# Patient Record
Sex: FEMALE | Race: WHITE | Employment: FULL TIME | URBAN - METROPOLITAN AREA
[De-identification: names, ages, dates, MRNs, and addresses within clinical notes are randomized per-mention and may not be internally consistent; named-entity substitution may affect disease eponyms.]

---

## 2023-09-20 ENCOUNTER — OFFICE VISIT (OUTPATIENT)
Dept: ENT CLINIC | Age: 58
End: 2023-09-20
Payer: COMMERCIAL

## 2023-09-20 VITALS
HEART RATE: 63 BPM | SYSTOLIC BLOOD PRESSURE: 127 MMHG | HEIGHT: 65 IN | OXYGEN SATURATION: 98 % | WEIGHT: 191 LBS | BODY MASS INDEX: 31.82 KG/M2 | RESPIRATION RATE: 16 BRPM | DIASTOLIC BLOOD PRESSURE: 81 MMHG

## 2023-09-20 DIAGNOSIS — J34.89 NASAL OBSTRUCTION: ICD-10-CM

## 2023-09-20 DIAGNOSIS — J32.9 CHRONIC SINUSITIS, UNSPECIFIED LOCATION: ICD-10-CM

## 2023-09-20 DIAGNOSIS — R09.81 NASAL CONGESTION: ICD-10-CM

## 2023-09-20 DIAGNOSIS — J34.2 DEVIATED NASAL SEPTUM: ICD-10-CM

## 2023-09-20 DIAGNOSIS — J34.3 HYPERTROPHY OF BOTH INFERIOR NASAL TURBINATES: ICD-10-CM

## 2023-09-20 DIAGNOSIS — H04.202 LEFT EPIPHORA: Primary | ICD-10-CM

## 2023-09-20 PROCEDURE — 31231 NASAL ENDOSCOPY DX: CPT | Performed by: STUDENT IN AN ORGANIZED HEALTH CARE EDUCATION/TRAINING PROGRAM

## 2023-09-20 PROCEDURE — 99204 OFFICE O/P NEW MOD 45 MIN: CPT | Performed by: STUDENT IN AN ORGANIZED HEALTH CARE EDUCATION/TRAINING PROGRAM

## 2023-09-20 RX ORDER — CELECOXIB 200 MG/1
200 CAPSULE ORAL 2 TIMES DAILY
COMMUNITY
Start: 2022-12-12

## 2023-09-20 RX ORDER — DULOXETIN HYDROCHLORIDE 60 MG/1
CAPSULE, DELAYED RELEASE ORAL
COMMUNITY
Start: 2023-08-31

## 2023-09-20 RX ORDER — CETIRIZINE HYDROCHLORIDE 10 MG/1
10 TABLET ORAL EVERY MORNING
COMMUNITY

## 2023-09-20 RX ORDER — FLUTICASONE PROPIONATE 50 MCG
SPRAY, SUSPENSION (ML) NASAL
COMMUNITY
Start: 2023-04-11

## 2023-09-20 RX ORDER — OMEPRAZOLE 20 MG/1
CAPSULE, DELAYED RELEASE ORAL
COMMUNITY
Start: 2023-07-13

## 2023-09-20 RX ORDER — BUSPIRONE HYDROCHLORIDE 15 MG/1
7.5-15 TABLET ORAL 2 TIMES DAILY
COMMUNITY
Start: 2022-12-12

## 2023-09-20 NOTE — PROGRESS NOTES
Chriss Estrada (:  1965) is a 61 y.o. female, here for evaluation of the following chief complaint(s):  New Patient (Blocked tear duct left )      ASSESSMENT/PLAN:  1. Left epiphora  2. Deviated nasal septum  3. Chronic sinusitis, unspecified location  -     CT SINUS FOR IMAGE GUIDANCE; Future  4. Nasal obstruction  5. Nasal congestion  6. Hypertrophy of both inferior nasal turbinates      This is a very pleasant 61 y.o. female here today for evaluation of the the above-noted complaints. The patient has physical exam findings and history consistent with nasal obstruction in the setting of septal deviation and turbinate hypertrophy as well as left-sided epiphora. We discussed that she has tried appropriate medical therapy including nasal saline, fluticasone, oral antihistamines, antibiotics and antibiotic eyedrops and is still symptomatic and would be a candidate for revision septoplasty and inferior turbinate reduction. We will also plan to perform an endoscopic dacryocystorhinostomy and left-sided anterior ethmoidectomy.     We will obtain a CT scan of her sinuses to evaluate for chronic sinusitis and other anatomical abnormalities and to allow for preoperative planning.      -Risks of sinus surgery include anosmia/hyposmia, dysgeusia, hemorrhage, pain, infection/inflammation, numbness to the maxillary tissues or dentition, CSF leak (with risk of meningitis or intracranial infection), orbital complications (diplopia, blurry vision, blindness), need for further procedures  -Anesthesia carries its own complications which will need to be discussed with the Anesthesiology team on the day of surgery  -Patient will need to obtain PCP clearance prior to surgery, will need to be off anticoagulants prior to surgery   -Risks and benefits of septoplasty including pain, inflammation, infection, hemorrhage, cosmesis (including nasal valve

## 2023-09-21 DIAGNOSIS — J34.2 DEVIATED NASAL SEPTUM: Primary | ICD-10-CM

## 2023-09-21 DIAGNOSIS — J32.9 CHRONIC SINUSITIS, UNSPECIFIED LOCATION: ICD-10-CM

## 2023-09-21 DIAGNOSIS — H04.202 LEFT EPIPHORA: ICD-10-CM

## 2023-09-26 ENCOUNTER — TELEPHONE (OUTPATIENT)
Dept: ENT CLINIC | Age: 58
End: 2023-09-26

## 2023-09-26 NOTE — TELEPHONE ENCOUNTER
Coretta Robertson from [address] prior [employer] to make sure we are doing PA for pt's CT that is scheduled at [facility] on 10/3/23; please fax [facility] when received to [facility] at [phone]. Call if questions.

## 2023-09-28 NOTE — TELEPHONE ENCOUNTER
CT Scan approved [facility] [place]

## 2023-10-03 ENCOUNTER — PATIENT MESSAGE (OUTPATIENT)
Dept: ENT CLINIC | Age: 58
End: 2023-10-03

## 2023-10-27 NOTE — PROGRESS NOTES
[address]  time__0600______        Surgery time_____0730_______    Take the following medications with a sip of water: Follow your MD/Surgeons pre-procedure instructions regarding your medications     Do not eat or drink anything after 12:00 midnight prior to your surgery. This includes water chewing gum, mints and ice chips. You may brush your teeth and gargle the morning of your surgery, but do not swallow the water     Please see your family doctor/pediatrician for a history and physical and/or concerning medications. Bring any test results/reports from your physicians office. If you are under the care of a heart doctor or specialist doctor, please be aware that you may be asked to them for clearance    You may be asked to stop blood thinners such as Coumadin, Plavix, Fragmin, Lovenox, etc., or any anti-inflammatories such as:  Aspirin, Ibuprofen, Advil, Naproxen prior to your surgery. We also ask that you stop any OTC medications such as fish oil, vitamin E, glucosamine, garlic, Multivitamins, COQ 10, etc.    We ask that you do not smoke 24 hours prior to surgery  We ask that you do not  drink any alcoholic beverages 24 hours prior to surgery     You must make arrangements for a responsible adult to take you home after your surgery. For your safety you will not be allowed to leave alone or drive yourself home. Your surgery will be cancelled if you do not have a ride home. Also for your safety, it is strongly suggested that someone stay with you the first 24 hours after your surgery. A parent or legal guardian must accompany a child scheduled for surgery and plan to stay at the hospital until the child is discharged. Please do not bring other children with you. For your comfort, please wear simple loose fitting clothing to the hospital.  Please do not bring valuables.     Do not wear any make-up or nail polish on your fingers or

## 2023-10-27 NOTE — PROGRESS NOTES
[facility] Pre-Admission Testing Electronic Communication Worksheet for OR/ENDO Procedures        Patient: Pascual Franks    DOS: 11/9    Arrival Time: 0600    Surgery Time:0730    Meds to Bed:  [x] YES    []  NO    Transportation Confirmed: [x] YES    []  NO    History and Physical:  [x] YES    []  NO  [] N/A  If yes, please list doctor or Urgent Care and date of H&P:   see Joann Worthington in encounters or media for 10/18 H&P with EKG  Sandy Solorzano in encounters al Clearance(Cardiac, Pulmonary, etc):  [] YES    [x]  NO    Pre-Admission Testing Visit:  [] YES    [x]  NO If no, do labs/testing need to be done DOS?   [] YES    []  NO    Medication Reconciliation Complete:  [x] YES    []  NO        Additional Notes:                Interview Complete: [x] YES    []  Nikki Navarro RN  12:24 PM

## 2023-11-08 ENCOUNTER — ANESTHESIA EVENT (OUTPATIENT)
Dept: OPERATING ROOM | Age: 58
End: 2023-11-08
Payer: COMMERCIAL

## 2023-11-09 ENCOUNTER — ANESTHESIA (OUTPATIENT)
Dept: OPERATING ROOM | Age: 58
End: 2023-11-09
Payer: COMMERCIAL

## 2023-11-09 ENCOUNTER — HOSPITAL ENCOUNTER (OUTPATIENT)
Age: 58
Setting detail: OUTPATIENT SURGERY
Discharge: HOME | End: 2023-11-09
Attending: STUDENT IN AN ORGANIZED HEALTH CARE EDUCATION/TRAINING PROGRAM | Admitting: STUDENT IN AN ORGANIZED HEALTH CARE EDUCATION/TRAINING PROGRAM
Payer: COMMERCIAL

## 2023-11-09 VITALS
DIASTOLIC BLOOD PRESSURE: 79 MMHG | BODY MASS INDEX: 31.63 KG/M2 | WEIGHT: 189.82 LBS | TEMPERATURE: 98.4 F | HEART RATE: 92 BPM | OXYGEN SATURATION: 96 % | SYSTOLIC BLOOD PRESSURE: 134 MMHG | HEIGHT: 65 IN | RESPIRATION RATE: 18 BRPM

## 2023-11-09 DIAGNOSIS — G89.18 POST-OP PAIN: Primary | ICD-10-CM

## 2023-11-09 PROBLEM — J34.89 REFRACTORY OBSTRUCTION OF NASAL AIRWAY: Status: ACTIVE | Noted: 2023-11-09

## 2023-11-09 PROCEDURE — 2500000003 HC RX 250 WO HCPCS

## 2023-11-09 PROCEDURE — 2580000003 HC RX 258

## 2023-11-09 PROCEDURE — 7100000011 HC PHASE II RECOVERY - ADDTL 15 MIN: Performed by: STUDENT IN AN ORGANIZED HEALTH CARE EDUCATION/TRAINING PROGRAM

## 2023-11-09 PROCEDURE — 6360000002 HC RX W HCPCS

## 2023-11-09 PROCEDURE — 7100000010 HC PHASE II RECOVERY - FIRST 15 MIN: Performed by: STUDENT IN AN ORGANIZED HEALTH CARE EDUCATION/TRAINING PROGRAM

## 2023-11-09 PROCEDURE — 2709999900 HC NON-CHARGEABLE SUPPLY: Performed by: STUDENT IN AN ORGANIZED HEALTH CARE EDUCATION/TRAINING PROGRAM

## 2023-11-09 PROCEDURE — 7100000000 HC PACU RECOVERY - FIRST 15 MIN: Performed by: STUDENT IN AN ORGANIZED HEALTH CARE EDUCATION/TRAINING PROGRAM

## 2023-11-09 PROCEDURE — 2500000003 HC RX 250 WO HCPCS: Performed by: OPHTHALMOLOGY

## 2023-11-09 PROCEDURE — 31240 NSL/SNS NDSC CNCH BULL RESCJ: CPT | Performed by: STUDENT IN AN ORGANIZED HEALTH CARE EDUCATION/TRAINING PROGRAM

## 2023-11-09 PROCEDURE — 6360000002 HC RX W HCPCS: Performed by: ANESTHESIOLOGY

## 2023-11-09 PROCEDURE — 6360000002 HC RX W HCPCS: Performed by: STUDENT IN AN ORGANIZED HEALTH CARE EDUCATION/TRAINING PROGRAM

## 2023-11-09 PROCEDURE — 3700000001 HC ADD 15 MINUTES (ANESTHESIA): Performed by: STUDENT IN AN ORGANIZED HEALTH CARE EDUCATION/TRAINING PROGRAM

## 2023-11-09 PROCEDURE — 7100000001 HC PACU RECOVERY - ADDTL 15 MIN: Performed by: STUDENT IN AN ORGANIZED HEALTH CARE EDUCATION/TRAINING PROGRAM

## 2023-11-09 PROCEDURE — 61782 SCAN PROC CRANIAL EXTRA: CPT | Performed by: STUDENT IN AN ORGANIZED HEALTH CARE EDUCATION/TRAINING PROGRAM

## 2023-11-09 PROCEDURE — 2720000010 HC SURG SUPPLY STERILE: Performed by: STUDENT IN AN ORGANIZED HEALTH CARE EDUCATION/TRAINING PROGRAM

## 2023-11-09 PROCEDURE — C1783 OCULAR IMP, AQUEOUS DRAIN DE: HCPCS | Performed by: STUDENT IN AN ORGANIZED HEALTH CARE EDUCATION/TRAINING PROGRAM

## 2023-11-09 PROCEDURE — 2580000003 HC RX 258: Performed by: ANESTHESIOLOGY

## 2023-11-09 PROCEDURE — A4217 STERILE WATER/SALINE, 500 ML: HCPCS | Performed by: STUDENT IN AN ORGANIZED HEALTH CARE EDUCATION/TRAINING PROGRAM

## 2023-11-09 PROCEDURE — 3700000000 HC ANESTHESIA ATTENDED CARE: Performed by: STUDENT IN AN ORGANIZED HEALTH CARE EDUCATION/TRAINING PROGRAM

## 2023-11-09 PROCEDURE — 2580000003 HC RX 258: Performed by: STUDENT IN AN ORGANIZED HEALTH CARE EDUCATION/TRAINING PROGRAM

## 2023-11-09 PROCEDURE — 2500000003 HC RX 250 WO HCPCS: Performed by: STUDENT IN AN ORGANIZED HEALTH CARE EDUCATION/TRAINING PROGRAM

## 2023-11-09 PROCEDURE — 6370000000 HC RX 637 (ALT 250 FOR IP): Performed by: ANESTHESIOLOGY

## 2023-11-09 PROCEDURE — 30520 REPAIR OF NASAL SEPTUM: CPT | Performed by: STUDENT IN AN ORGANIZED HEALTH CARE EDUCATION/TRAINING PROGRAM

## 2023-11-09 PROCEDURE — 3600000005 HC SURGERY LEVEL 5 BASE: Performed by: STUDENT IN AN ORGANIZED HEALTH CARE EDUCATION/TRAINING PROGRAM

## 2023-11-09 PROCEDURE — 3600000015 HC SURGERY LEVEL 5 ADDTL 15MIN: Performed by: STUDENT IN AN ORGANIZED HEALTH CARE EDUCATION/TRAINING PROGRAM

## 2023-11-09 PROCEDURE — 30140 RESECT INFERIOR TURBINATE: CPT | Performed by: STUDENT IN AN ORGANIZED HEALTH CARE EDUCATION/TRAINING PROGRAM

## 2023-11-09 DEVICE — LACRIMAL INTUBATION SET CRAWFORD
Type: IMPLANTABLE DEVICE | Site: LACRIMAL DUCT | Status: FUNCTIONAL
Brand: CRAWFORD

## 2023-11-09 RX ORDER — GLYCOPYRROLATE 0.2 MG/ML
INJECTION INTRAMUSCULAR; INTRAVENOUS PRN
Status: DISCONTINUED | OUTPATIENT
Start: 2023-11-09 | End: 2023-11-09 | Stop reason: SDUPTHER

## 2023-11-09 RX ORDER — FAMOTIDINE 10 MG/ML
INJECTION, SOLUTION INTRAVENOUS PRN
Status: DISCONTINUED | OUTPATIENT
Start: 2023-11-09 | End: 2023-11-09 | Stop reason: SDUPTHER

## 2023-11-09 RX ORDER — PROPOFOL 10 MG/ML
INJECTION, EMULSION INTRAVENOUS PRN
Status: DISCONTINUED | OUTPATIENT
Start: 2023-11-09 | End: 2023-11-09 | Stop reason: SDUPTHER

## 2023-11-09 RX ORDER — LIDOCAINE HYDROCHLORIDE AND EPINEPHRINE 10; 10 MG/ML; UG/ML
INJECTION, SOLUTION INFILTRATION; PERINEURAL
Status: COMPLETED | OUTPATIENT
Start: 2023-11-09 | End: 2023-11-09

## 2023-11-09 RX ORDER — SODIUM CHLORIDE, SODIUM LACTATE, POTASSIUM CHLORIDE, CALCIUM CHLORIDE 600; 310; 30; 20 MG/100ML; MG/100ML; MG/100ML; MG/100ML
INJECTION, SOLUTION INTRAVENOUS CONTINUOUS PRN
Status: DISCONTINUED | OUTPATIENT
Start: 2023-11-09 | End: 2023-11-09 | Stop reason: SDUPTHER

## 2023-11-09 RX ORDER — PHENYLEPHRINE HCL IN 0.9% NACL 1 MG/10 ML
SYRINGE (ML) INTRAVENOUS PRN
Status: DISCONTINUED | OUTPATIENT
Start: 2023-11-09 | End: 2023-11-09

## 2023-11-09 RX ORDER — ONDANSETRON 2 MG/ML
4 INJECTION INTRAMUSCULAR; INTRAVENOUS
Status: DISCONTINUED | OUTPATIENT
Start: 2023-11-09 | End: 2023-11-09 | Stop reason: HOSPADM

## 2023-11-09 RX ORDER — SODIUM CHLORIDE 0.9 % (FLUSH) 0.9 %
5-40 SYRINGE (ML) INJECTION PRN
Status: DISCONTINUED | OUTPATIENT
Start: 2023-11-09 | End: 2023-11-09 | Stop reason: HOSPADM

## 2023-11-09 RX ORDER — DEXAMETHASONE SODIUM PHOSPHATE 4 MG/ML
INJECTION, SOLUTION INTRA-ARTICULAR; INTRALESIONAL; INTRAMUSCULAR; INTRAVENOUS; SOFT TISSUE PRN
Status: DISCONTINUED | OUTPATIENT
Start: 2023-11-09 | End: 2023-11-09 | Stop reason: SDUPTHER

## 2023-11-09 RX ORDER — SODIUM CHLORIDE 0.9 % (FLUSH) 0.9 %
5-40 SYRINGE (ML) INJECTION EVERY 12 HOURS SCHEDULED
Status: DISCONTINUED | OUTPATIENT
Start: 2023-11-09 | End: 2023-11-09 | Stop reason: HOSPADM

## 2023-11-09 RX ORDER — SODIUM CHLORIDE 9 MG/ML
INJECTION, SOLUTION INTRAVENOUS PRN
Status: DISCONTINUED | OUTPATIENT
Start: 2023-11-09 | End: 2023-11-09 | Stop reason: HOSPADM

## 2023-11-09 RX ORDER — BALANCED SALT SOLUTION 6.4; .75; .48; .3; 3.9; 1.7 MG/ML; MG/ML; MG/ML; MG/ML; MG/ML; MG/ML
SOLUTION OPHTHALMIC
Status: COMPLETED | OUTPATIENT
Start: 2023-11-09 | End: 2023-11-09

## 2023-11-09 RX ORDER — EPINEPHRINE 1 MG/ML
INJECTION, SOLUTION, CONCENTRATE INTRAVENOUS
Status: COMPLETED | OUTPATIENT
Start: 2023-11-09 | End: 2023-11-09

## 2023-11-09 RX ORDER — ECHINACEA PURPUREA EXTRACT 125 MG
TABLET ORAL
Qty: 30 ML | Refills: 1 | Status: SHIPPED | OUTPATIENT
Start: 2023-11-09

## 2023-11-09 RX ORDER — FENTANYL CITRATE 50 UG/ML
INJECTION, SOLUTION INTRAMUSCULAR; INTRAVENOUS PRN
Status: DISCONTINUED | OUTPATIENT
Start: 2023-11-09 | End: 2023-11-09 | Stop reason: SDUPTHER

## 2023-11-09 RX ORDER — FENTANYL CITRATE 0.05 MG/ML
50 INJECTION, SOLUTION INTRAMUSCULAR; INTRAVENOUS EVERY 5 MIN PRN
Status: DISCONTINUED | OUTPATIENT
Start: 2023-11-09 | End: 2023-11-09 | Stop reason: HOSPADM

## 2023-11-09 RX ORDER — SCOLOPAMINE TRANSDERMAL SYSTEM 1 MG/1
1 PATCH, EXTENDED RELEASE TRANSDERMAL ONCE
Status: DISCONTINUED | OUTPATIENT
Start: 2023-11-09 | End: 2023-11-09 | Stop reason: HOSPADM

## 2023-11-09 RX ORDER — OXYCODONE HYDROCHLORIDE 10 MG/1
10 TABLET ORAL PRN
Status: COMPLETED | OUTPATIENT
Start: 2023-11-09 | End: 2023-11-09

## 2023-11-09 RX ORDER — LIDOCAINE HYDROCHLORIDE 20 MG/ML
INJECTION, SOLUTION EPIDURAL; INFILTRATION; INTRACAUDAL; PERINEURAL PRN
Status: DISCONTINUED | OUTPATIENT
Start: 2023-11-09 | End: 2023-11-09 | Stop reason: SDUPTHER

## 2023-11-09 RX ORDER — DOXYCYCLINE HYCLATE 100 MG
100 TABLET ORAL 2 TIMES DAILY
Qty: 14 TABLET | Refills: 0 | Status: SHIPPED | OUTPATIENT
Start: 2023-11-09 | End: 2023-11-16

## 2023-11-09 RX ORDER — ACETAMINOPHEN 325 MG/1
650 TABLET ORAL
Status: DISCONTINUED | OUTPATIENT
Start: 2023-11-09 | End: 2023-11-09 | Stop reason: HOSPADM

## 2023-11-09 RX ORDER — OXYCODONE HYDROCHLORIDE 5 MG/1
5 TABLET ORAL PRN
Status: COMPLETED | OUTPATIENT
Start: 2023-11-09 | End: 2023-11-09

## 2023-11-09 RX ORDER — MAGNESIUM HYDROXIDE 1200 MG/15ML
LIQUID ORAL CONTINUOUS PRN
Status: DISCONTINUED | OUTPATIENT
Start: 2023-11-09 | End: 2023-11-09 | Stop reason: HOSPADM

## 2023-11-09 RX ORDER — PROCHLORPERAZINE EDISYLATE 5 MG/ML
5 INJECTION INTRAMUSCULAR; INTRAVENOUS
Status: DISCONTINUED | OUTPATIENT
Start: 2023-11-09 | End: 2023-11-09 | Stop reason: HOSPADM

## 2023-11-09 RX ORDER — METOCLOPRAMIDE HYDROCHLORIDE 5 MG/ML
INJECTION INTRAMUSCULAR; INTRAVENOUS PRN
Status: DISCONTINUED | OUTPATIENT
Start: 2023-11-09 | End: 2023-11-09 | Stop reason: SDUPTHER

## 2023-11-09 RX ORDER — TRIAMCINOLONE ACETONIDE 40 MG/ML
INJECTION, SUSPENSION INTRA-ARTICULAR; INTRAMUSCULAR
Status: COMPLETED | OUTPATIENT
Start: 2023-11-09 | End: 2023-11-09

## 2023-11-09 RX ORDER — HYDROCODONE BITARTRATE AND ACETAMINOPHEN 5; 325 MG/1; MG/1
1 TABLET ORAL EVERY 6 HOURS PRN
Qty: 12 TABLET | Refills: 0 | Status: SHIPPED | OUTPATIENT
Start: 2023-11-09 | End: 2023-11-12

## 2023-11-09 RX ORDER — ROCURONIUM BROMIDE 10 MG/ML
INJECTION, SOLUTION INTRAVENOUS PRN
Status: DISCONTINUED | OUTPATIENT
Start: 2023-11-09 | End: 2023-11-09 | Stop reason: SDUPTHER

## 2023-11-09 RX ORDER — EPHEDRINE SULFATE 50 MG/ML
INJECTION INTRAVENOUS PRN
Status: DISCONTINUED | OUTPATIENT
Start: 2023-11-09 | End: 2023-11-09 | Stop reason: SDUPTHER

## 2023-11-09 RX ORDER — CEFAZOLIN SODIUM 1 G/3ML
INJECTION, POWDER, FOR SOLUTION INTRAMUSCULAR; INTRAVENOUS PRN
Status: DISCONTINUED | OUTPATIENT
Start: 2023-11-09 | End: 2023-11-09 | Stop reason: SDUPTHER

## 2023-11-09 RX ORDER — TOBRAMYCIN AND DEXAMETHASONE 3; 1 MG/ML; MG/ML
1 SUSPENSION/ DROPS OPHTHALMIC 3 TIMES DAILY
Qty: 2.5 ML | Refills: 0 | Status: SHIPPED | OUTPATIENT
Start: 2023-11-09 | End: 2023-11-09 | Stop reason: HOSPADM

## 2023-11-09 RX ORDER — ONDANSETRON 2 MG/ML
INJECTION INTRAMUSCULAR; INTRAVENOUS PRN
Status: DISCONTINUED | OUTPATIENT
Start: 2023-11-09 | End: 2023-11-09 | Stop reason: SDUPTHER

## 2023-11-09 RX ORDER — MEPERIDINE HYDROCHLORIDE 25 MG/ML
12.5 INJECTION INTRAMUSCULAR; INTRAVENOUS; SUBCUTANEOUS EVERY 5 MIN PRN
Status: DISCONTINUED | OUTPATIENT
Start: 2023-11-09 | End: 2023-11-09 | Stop reason: HOSPADM

## 2023-11-09 RX ORDER — PHENYLEPHRINE HYDROCHLORIDE 10 MG/ML
INJECTION INTRAVENOUS PRN
Status: DISCONTINUED | OUTPATIENT
Start: 2023-11-09 | End: 2023-11-09 | Stop reason: SDUPTHER

## 2023-11-09 RX ADMIN — FENTANYL CITRATE 50 MCG: 0.05 INJECTION, SOLUTION INTRAMUSCULAR; INTRAVENOUS at 09:48

## 2023-11-09 RX ADMIN — EPHEDRINE SULFATE 5 MG: 50 INJECTION INTRAVENOUS at 08:09

## 2023-11-09 RX ADMIN — EPHEDRINE SULFATE 10 MG: 50 INJECTION INTRAVENOUS at 09:18

## 2023-11-09 RX ADMIN — METOCLOPRAMIDE 10 MG: 5 INJECTION, SOLUTION INTRAMUSCULAR; INTRAVENOUS at 07:55

## 2023-11-09 RX ADMIN — FAMOTIDINE 20 MG: 10 INJECTION, SOLUTION INTRAVENOUS at 07:55

## 2023-11-09 RX ADMIN — FENTANYL CITRATE 50 MCG: 50 INJECTION INTRAMUSCULAR; INTRAVENOUS at 09:07

## 2023-11-09 RX ADMIN — PHENYLEPHRINE HYDROCHLORIDE 150 MCG: 10 INJECTION INTRAVENOUS at 08:15

## 2023-11-09 RX ADMIN — DEXAMETHASONE SODIUM PHOSPHATE 10 MG: 4 INJECTION, SOLUTION INTRAMUSCULAR; INTRAVENOUS at 07:49

## 2023-11-09 RX ADMIN — SODIUM CHLORIDE, SODIUM LACTATE, POTASSIUM CHLORIDE, AND CALCIUM CHLORIDE: .6; .31; .03; .02 INJECTION, SOLUTION INTRAVENOUS at 09:21

## 2023-11-09 RX ADMIN — CEFAZOLIN SODIUM 2 G: 1 INJECTION, POWDER, FOR SOLUTION INTRAMUSCULAR; INTRAVENOUS at 07:51

## 2023-11-09 RX ADMIN — FENTANYL CITRATE 50 MCG: 50 INJECTION INTRAMUSCULAR; INTRAVENOUS at 07:38

## 2023-11-09 RX ADMIN — SODIUM CHLORIDE: 9 INJECTION, SOLUTION INTRAVENOUS at 06:39

## 2023-11-09 RX ADMIN — PHENYLEPHRINE HYDROCHLORIDE 50 MCG/MIN: 10 INJECTION INTRAVENOUS at 08:19

## 2023-11-09 RX ADMIN — LIDOCAINE HYDROCHLORIDE 100 MG: 20 INJECTION, SOLUTION EPIDURAL; INFILTRATION; INTRACAUDAL; PERINEURAL at 07:38

## 2023-11-09 RX ADMIN — PHENYLEPHRINE HYDROCHLORIDE 150 MCG: 10 INJECTION INTRAVENOUS at 08:03

## 2023-11-09 RX ADMIN — ROCURONIUM BROMIDE 50 MG: 10 INJECTION INTRAVENOUS at 07:38

## 2023-11-09 RX ADMIN — ONDANSETRON 4 MG: 2 INJECTION INTRAMUSCULAR; INTRAVENOUS at 09:15

## 2023-11-09 RX ADMIN — OXYCODONE HYDROCHLORIDE 10 MG: 10 TABLET ORAL at 10:43

## 2023-11-09 RX ADMIN — EPHEDRINE SULFATE 10 MG: 50 INJECTION INTRAVENOUS at 08:21

## 2023-11-09 RX ADMIN — EPHEDRINE SULFATE 5 MG: 50 INJECTION INTRAVENOUS at 09:23

## 2023-11-09 RX ADMIN — EPHEDRINE SULFATE 10 MG: 50 INJECTION INTRAVENOUS at 08:01

## 2023-11-09 RX ADMIN — PROPOFOL 100 MG: 10 INJECTION, EMULSION INTRAVENOUS at 07:43

## 2023-11-09 RX ADMIN — GLYCOPYRROLATE 0.2 MG: 0.2 INJECTION INTRAMUSCULAR; INTRAVENOUS at 08:17

## 2023-11-09 RX ADMIN — EPHEDRINE SULFATE 10 MG: 50 INJECTION INTRAVENOUS at 08:15

## 2023-11-09 RX ADMIN — PROPOFOL 150 MG: 10 INJECTION, EMULSION INTRAVENOUS at 07:38

## 2023-11-09 ASSESSMENT — PAIN DESCRIPTION - LOCATION
LOCATION: NOSE

## 2023-11-09 ASSESSMENT — PAIN DESCRIPTION - ONSET
ONSET: ON-GOING

## 2023-11-09 ASSESSMENT — PAIN - FUNCTIONAL ASSESSMENT
PAIN_FUNCTIONAL_ASSESSMENT: ACTIVITIES ARE NOT PREVENTED
PAIN_FUNCTIONAL_ASSESSMENT: ACTIVITIES ARE NOT PREVENTED
PAIN_FUNCTIONAL_ASSESSMENT: 0-10
PAIN_FUNCTIONAL_ASSESSMENT: PREVENTS OR INTERFERES SOME ACTIVE ACTIVITIES AND ADLS
PAIN_FUNCTIONAL_ASSESSMENT: ACTIVITIES ARE NOT PREVENTED

## 2023-11-09 ASSESSMENT — PAIN DESCRIPTION - ORIENTATION
ORIENTATION: MID
ORIENTATION: MID;UPPER
ORIENTATION: MID
ORIENTATION: MID

## 2023-11-09 ASSESSMENT — PAIN DESCRIPTION - DESCRIPTORS
DESCRIPTORS: ACHING;DISCOMFORT
DESCRIPTORS: STABBING;THROBBING
DESCRIPTORS: ACHING;DISCOMFORT
DESCRIPTORS: ACHING;DISCOMFORT
DESCRIPTORS: THROBBING
DESCRIPTORS: ACHING

## 2023-11-09 ASSESSMENT — PAIN SCALES - GENERAL
PAINLEVEL_OUTOF10: 7
PAINLEVEL_OUTOF10: 5
PAINLEVEL_OUTOF10: 4
PAINLEVEL_OUTOF10: 5
PAINLEVEL_OUTOF10: 7
PAINLEVEL_OUTOF10: 5

## 2023-11-09 ASSESSMENT — PAIN DESCRIPTION - PAIN TYPE
TYPE: SURGICAL PAIN

## 2023-11-09 ASSESSMENT — PAIN DESCRIPTION - FREQUENCY
FREQUENCY: CONTINUOUS

## 2023-11-09 ASSESSMENT — ENCOUNTER SYMPTOMS: SHORTNESS OF BREATH: 0

## 2023-11-09 NOTE — PROGRESS NOTES
Pt wakes easy to v/o. VSS. pt states pain tolerable denies nausea. Drainage pad remains cdi ice pack applied to face per pt's comfort. Pt stable ready for phase 2.

## 2023-11-09 NOTE — H&P
Chriss Estrada (:  1965) is a 61 y.o. female, here for evaluation of the following chief complaint(s):  New Patient (Blocked tear duct left )      ASSESSMENT/PLAN:  1. Left epiphora  2. Deviated nasal septum  3. Chronic sinusitis, unspecified location  -     CT SINUS FOR IMAGE GUIDANCE; Future  4. Nasal obstruction  5. Nasal congestion  6.  Hypertrophy of both inferior nasal turbinates      This is a very pleasant 61 y.o. female here today for evaluation of the the above-noted complaints.          -Risks of sinus surgery include anosmia/hyposmia, dysgeusia, hemorrhage, pain, infection/inflammation, numbness to the maxillary tissues or dentition, CSF leak (with risk of meningitis or intracranial infection), orbital complications (diplopia, blurry vision, blindness), need for further procedures  -Anesthesia carries its own complications which will need to be discussed with the Anesthesiology team on the day of surgery  -Patient will need to obtain PCP clearance prior to surgery, will need to be off anticoagulants prior to surgery   -Risks and benefits of septoplasty including pain, inflammation, infection, hemorrhage, cosmesis (including nasal valve collapse or saddle nose deformity), worsened nasal airway obstruction, hyposmia/anosmia, numbness to the teeth or gums, injury to the septum including septal perforation, need for further surgery

## 2023-11-09 NOTE — PROGRESS NOTES
Patient up to chair. Tolerating PO intake.  at bedside. Scripts delivered. Dressing remains clean, dry, and intact. Will continue to monitor.

## 2023-11-09 NOTE — ANESTHESIA POSTPROCEDURE EVALUATION
Department of Anesthesiology  Postprocedure Note    Patient: Vivi Goyal  MRN: [id number]  YOB: 1965  Date of evaluation: 11/9/2023      Procedure Summary       Date: 11/09/23 Room / Location: [address] [place] [address]    Anesthesia Start: 4813 Anesthesia Stop: 0939    Procedures:       REVISION SEPTOPLASTY, TURBINATE REDUCTION IMAGE GUIDANCE      LEFT ENDOSCOPIC DACRYOCYSTORHINOSTOMY (Left) Diagnosis:       Left epiphora      Deviated nasal septum      Chronic sinusitis, unspecified location      Nasal obstruction      Nasal congestion      Hypertrophy of inferior nasal turbinate      Obstruction of nasolacrimal duct, acquired, left      (Left epiphora [H04.202])      (Deviated nasal septum [J34.2])      (Chronic sinusitis, unspecified location [J32.9])      (Nasal obstruction [J34.89])      (Nasal congestion [R09.81])      (Hypertrophy of inferior nasal turbinate [J34.3])      (Obstruction of nasolacrimal duct, acquired, left [F47.009])    Surgeons: Nolvia Butcher MD; Paty Abreu MD Responsible Provider: Estrada Lal MD    Anesthesia Type: general ASA Status: 2            Anesthesia Type: No value filed.     Cheri Phase I: Cheri Score: 9    Cheri Phase II: Cheri Score: 10      Anesthesia Post Evaluation    Patient location during evaluation: bedside  Patient participation: complete - patient participated  Level of consciousness: awake and alert  Pain score: 2  Airway patency: patent  Nausea & Vomiting: no vomiting  Complications: no  Cardiovascular status: blood pressure returned to baseline  Respiratory status: acceptable  Hydration status: euvolemic  Pain management: adequate

## 2023-11-09 NOTE — DISCHARGE INSTRUCTIONS
POST OP INSTRUCTIONS FOR TEAR DUCT SURGERY    Post Operative Instructions   [facility] ENT [phone]    The Surgery Itself   Endoscopic dacryocystorhinostomy/septal surgery involves general anesthesia. Patients may be sedated for several hours after surgery and may remain sleepy for the better part of the day. Nausea and vomiting is occasionally seen, and usually resolves by the evening of surgery - even without additional medications. Almost all patients can go home the day of surgery. After Surgery  You may have plastic splints in your nose following surgery; this will make breathing through your nose difficult. A humidifier or vaporizer can be used in the bedroom to prevent throat pain with mouth breathing. Bloody nasal drainage is normal after this surgery for 5-7 days, usually decreasing in volume with each day that passes. Drainage will flow from the front of the nose and down the back of the throat. Make sure you spit out blood drainage that drips down the back of your throat to prevent nausea/vomiting. You will have a nasal drip pad/sling with gauze to catch drainage from the front of your nose. The dressing may need to be changed frequently during the first 24 hours following surgery. In case of profuse nasal bleeding, you may apply ice to the bridge of the nose and pinch the nose just above the tip and hold for 10 minutes; if profuse bleeding continues, contact the doctor's office. You may notice facial pressure and fullness similar to a mild sinus infection. This is more common if splints are in place. Frequent hot showers, breathing in steam from a pot of boiling water, or simply sniffing a small amount of water through your nose will help break up congestion and clear any clot or mucus that builds up within the nose after surgery. Do not pull at the splints, gauze or sutures in your nose after surgery.     It is more comfortable to sleep with extra pillows or in a recliner for the

## 2023-11-09 NOTE — OP NOTE
[facility] SURGERY  OPERATIVE REPORT    Patient Name: Tatiana Layne  YOB: 1965  Medical Record Number:  [id number]  Billing Number:  [id number]  Date of Procedure: 11/9/23  Time: 0730    Pre Operative Diagnoses:   1. Deviated nasal septum  2. Nasal obstruction  3. Epiphora-left  4. Acquired obstruction of left nasolacrimal duct  5. Inferior turbinate hypertrophy  6. Left madelaine bullosa    Post Operative Diagnoses:    Same           Procedure:           1. Endoscopic revision septoplasty-CPT 29494  2. Excision of left madelaine bullosa-CPT 31240-L  3. Submucous resection of the bilateral inferior turbinates- CPT 63087-70  4. Stereotactic extradural image guidance- CPT 15590       Surgeon: No att. providers found    OR Staff/ Assistant:  Circulator: Ayush León RN  Surgical Assistant: Lizzeth Feng Circulator: Kendra Rodrigues RN  Relief Scrub: Seema Ziegler  Scrub Person First: Rocael Rodriguez  Perioperative Nurse: Tammie George RN    Anesthesia:  General anesthesia. Findings:    Findings:   1. High leftward septal deviation on the left preventing access to the lacrimal system necessitating a septoplasty. Residual right lower deviation and obstruction from the maxillary crest.   2.  Extensive amount of inflammation of the lacrimal sac mucosa and area surrounding the sac. 3.  Reed bicannilicular stents and Kenalog soaked Gelfoam were placed at the site of the DCR. 4. Large left madelaine bullosa obstructing on the left side. Indications: This is a 61 y.o. female with nasal obstruction, deviated nasal septum, madelaine bullosa, inferior turbinate hypertrophy and acquired obstruction of the left nasolacrimal duct resulting in epiphora. .  Risks and benefits discussed with the patient including alternate treatment options, Informed consent was obtained, the patient elected to proceed with the planned procedure.     DETAILS OF There is noted to be a massive amount of inflammation and it took an extensive amount of dissection to get the lacrimal probes through the sac. The lacrimal sac was inspected and inflamed tissue was removed. Reed tubes were then placed into the bilateral canaliculi and they were brought out through the nose. These were then tied to secure it in place. The posteriorly based mucosal flap was then laid down to facilitate healing and prevent scarring. Gelfoam soaked in Kenalog was then placed overlying the area of the DCR. Heema pore was then placed into the madelaine bullosa dissection cavity and along the high septal incision. Dempsey splints were then secured in place in the nose. The nose and the oral cavity and esophagus were then suctioned out. Patient was then handed back to anesthesia where they awoke without difficulty. Estimated Blood Loss: less than 100 ml                 Specimens: * No specimens in log *           Drains: None    Complications: There were no complications.     Leonie Owens MD

## 2023-11-09 NOTE — PROGRESS NOTES
Pt arrived to pacu from OR asleep wakes to v/o. VSS on monitor. O2 off pt breathes easy on RA. Drainage pad under nose no drainage at this time, splints in nostrils. Pt denies pain and nausea falls easily back to sleep.

## 2023-11-09 NOTE — ANESTHESIA PRE PROCEDURE
Department of Anesthesiology  Preprocedure Note       Name:  Elis Fuller   Age:  61 y.o.  :  1965                                          MRN:  [id number]         Date:  2023      Surgeon: Malena Shelton):  MD Jolly Harper MD    Procedure: Procedure(s):  REVISION SEPTOPLASTY, TURBINATE REDUCTION IMAGE GUIDANCE  LEFT ENDOSCOPIC DACRYOCYSTORHINOSTOMY    Medications prior to admission:   Prior to Admission medications    Medication Sig Start Date End Date Taking? Authorizing Provider   busPIRone (BUSPAR) 15 MG tablet Take 7.5-15 mg by mouth 2 times daily 22   Idania Velazco MD   celecoxib (CELEBREX) 200 MG capsule Take 1 capsule by mouth 2 times daily 22   Idania Velazco MD   cetirizine (ZYRTEC) 10 MG tablet Take 1 tablet by mouth every morning    Idania Velazco MD   DULoxetine (CYMBALTA) 60 MG extended release capsule  23   Idania Velazco MD   fluticasone (FLONASE) 50 MCG/ACT nasal spray SPRAY TWO SPRAYS IN EACH NOSTRIL ONCE DAILY 23   Idania Velazco MD   omeprazole (PRILOSEC) 20 MG delayed release capsule  23   Idania Velazco MD       Current medications:    Current Facility-Administered Medications   Medication Dose Route Frequency Provider Last Rate Last Admin    sodium chloride flush 0.9 % injection 5-40 mL  5-40 mL IntraVENous 2 times per day Golden uBrrell MD        sodium chloride flush 0.9 % injection 5-40 mL  5-40 mL IntraVENous PRN Golden Burrell MD        0.9 % sodium chloride infusion   IntraVENous PRN Golden Burrell MD           Allergies:  No Known Allergies    Problem List:  There is no problem list on file for this patient.       Past Medical History:        Diagnosis Date    Anxiety     Depression     GERD (gastroesophageal reflux disease)     Seasonal allergies        Past Surgical History:        Procedure Laterality Date    HYSTERECTOMY (CERVIX STATUS UNKNOWN)  2023    compete hysterectomy

## 2023-11-16 ENCOUNTER — OFFICE VISIT (OUTPATIENT)
Dept: ENT CLINIC | Age: 58
End: 2023-11-16
Payer: COMMERCIAL

## 2023-11-16 VITALS
BODY MASS INDEX: 31.16 KG/M2 | DIASTOLIC BLOOD PRESSURE: 75 MMHG | TEMPERATURE: 98.2 F | WEIGHT: 187 LBS | SYSTOLIC BLOOD PRESSURE: 116 MMHG | HEART RATE: 73 BPM | HEIGHT: 65 IN | OXYGEN SATURATION: 95 % | RESPIRATION RATE: 16 BRPM

## 2023-11-16 DIAGNOSIS — J34.89 NASAL CRUSTING: ICD-10-CM

## 2023-11-16 DIAGNOSIS — H04.202 LEFT EPIPHORA: Primary | ICD-10-CM

## 2023-11-16 PROCEDURE — 99024 POSTOP FOLLOW-UP VISIT: CPT | Performed by: STUDENT IN AN ORGANIZED HEALTH CARE EDUCATION/TRAINING PROGRAM

## 2023-11-16 PROCEDURE — 31237 NSL/SINS NDSC SURG BX POLYPC: CPT | Performed by: STUDENT IN AN ORGANIZED HEALTH CARE EDUCATION/TRAINING PROGRAM

## 2023-11-16 NOTE — PROGRESS NOTES
[address] Pena (:  1965) is a 61 y.o. female, here for evaluation of the following chief complaint(s):  Post-Op Check      ASSESSMENT/PLAN:  1. Left epiphora  2. Nasal crusting      This is a very pleasant 61 y.o. female here today for evaluation of the the above-noted complaints. The patient is status post revision septoplasty, turbinate reduction and left-sided DCR on 2023.    -Finish perioperative medication  -Begin mupirocin to the bilateral naris  -Begin saline irrigations twice daily    Medical Decision Making: The following items were considered in medical decision making:  Independent review of images  Review / order clinical lab tests  Review / order radiology tests  Decision to obtain old records  Review and summation of old records as accessed through [facility] if applicable    SUBJECTIVE/OBJECTIVE:  HPI Corinne Lot is here today for evaluation of chronic sinonasal complaints and epiphora. She was referred by my colleague and oculoplastics for further evaluation management. Patient states she has had approximately 5 months of left ear tearing. She has tried multiple treatments for this including antibiotic and steroid eyedrops with no improvement. She is also tried antibiotics. She has a history of chemo and radiation. Patient also has a history of septoplasty in . She states that she still has persistent nasal obstruction and nasal congestion. She has difficulty breathing through her nose and has increased snoring. She has tried fluticasone, saline, oral antihistamines and antibiotics in the past.  She will get recurrent sinus infections which are worse during seasonal changes. No history of sinus surgery, epistaxis or loss of sense of smell. Update 2023:    Patient presents today for follow-up for issues related to recent surgery.   She states she is having a lot of nasal

## 2023-11-17 NOTE — OP NOTE
Operative Note      Patient: Pascual Franks  YOB: 1965  MRN: [id number]    Date of Procedure: 11/9/2023    Pre-Op Diagnosis Codes:     * Left epiphora [H04.202]     * Deviated nasal septum [J34.2]     * Chronic sinusitis, unspecified location [J32.9]     * Nasal obstruction [J34.89]     * Nasal congestion [R09.81]     * Hypertrophy of inferior nasal turbinate [J34.3]     * Obstruction of nasolacrimal duct, acquired, left [H04.552]    Post-Op Diagnosis: Same       Procedure(s):  REVISION SEPTOPLASTY, TURBINATE REDUCTION IMAGE GUIDANCE  LEFT ENDOSCOPIC DACRYOCYSTORHINOSTOMY    Surgeon(s):  MD Karmen Jarrett MD    Assistant:   Surgical Assistant: Cyn Ye    Anesthesia: General    Estimated Blood Loss (mL): less than 50     Complications: None    Specimens:   * No specimens in log *    Implants:  Implant Name Type Inv. Item Serial No.  Lot No. LRB No. Used Action   SET INTUB LACR PRB ANTON TBNG CRWFRD - [id number]  SET INTUB LACR PRB ANTON TBNG CRWFRD  [employer] [id number] Left 1 Implanted         Drains: * No LDAs found *    Indications for the Procedure:  60-year-old female who presented to the clinic with complaints of chronic tearing of the left eye. The patient was found to have complete stenosis of the lef nasolacrimal duct with epiphora due to insufficient drainage. Surgical repair was, thus, medically necessary. The risks, alternatives, and benefits of an endoscopic DCR and lacrimal intubation were discussed and the patient elected to proceed with surgery. She also presented deviation of the nasal septum and was referred to Dr. Karmen Izaguirre who will be correcting this. This is a combined procedure with Dr. Reyna Lima, who will be dictating a separate operative report for his part of the procedure. Detailed Description of Procedure: The patient was greeted in the preoperative area, where the correct site was identified and marked.  The patient was brought

## 2024-02-20 ENCOUNTER — OFFICE VISIT (OUTPATIENT)
Dept: ENT CLINIC | Age: 59
End: 2024-02-20
Payer: COMMERCIAL

## 2024-02-20 VITALS
HEART RATE: 66 BPM | OXYGEN SATURATION: 98 % | HEIGHT: 65 IN | BODY MASS INDEX: 31.65 KG/M2 | WEIGHT: 190 LBS | RESPIRATION RATE: 16 BRPM | SYSTOLIC BLOOD PRESSURE: 133 MMHG | DIASTOLIC BLOOD PRESSURE: 84 MMHG

## 2024-02-20 DIAGNOSIS — J34.89 NASAL CRUSTING: Primary | ICD-10-CM

## 2024-02-20 PROCEDURE — 31231 NASAL ENDOSCOPY DX: CPT | Performed by: STUDENT IN AN ORGANIZED HEALTH CARE EDUCATION/TRAINING PROGRAM

## 2024-02-20 NOTE — PROGRESS NOTES
[facility]  DIVISION OF OTOLARYNGOLOGY- HEAD & NECK SURGERY  CONSULT      Kyra Pena (:  1965) is a 58 y.o. female, here for evaluation of the following chief complaint(s):  Follow-up (Left epiphora )      ASSESSMENT/PLAN:  1. Nasal crusting      This is a very pleasant 58 y.o. female here today for evaluation of the the above-noted complaints.      The patient is status post revision septoplasty, turbinate reduction and left-sided DCR on 2023.    -Resolution of hemorrhagic myringitis    -Left DCR site widely patent  -Nasal saline as needed  -Follow-up as needed    Medical Decision Making:  The following items were considered in medical decision making:  Independent review of images  Review / order clinical lab tests  Review / order radiology tests  Decision to obtain old records  Review and summation of old records as accessed through [employer] if applicable    SUBJECTIVE/OBJECTIVE:  HPI    Kyra Pena is here today for evaluation of chronic sinonasal complaints and epiphora.  She was referred by my colleague and oculoplastics for further evaluation management.  Patient states she has had approximately 5 months of left ear tearing.  She has tried multiple treatments for this including antibiotic and steroid eyedrops with no improvement.  She is also tried antibiotics.  She has a history of chemo and radiation.    Patient also has a history of septoplasty in .  She states that she still has persistent nasal obstruction and nasal congestion.  She has difficulty breathing through her nose and has increased snoring.  She has tried fluticasone, saline, oral antihistamines and antibiotics in the past.  She will get recurrent sinus infections which are worse during seasonal changes.  No history of sinus surgery, epistaxis or loss of sense of smell.    Update 2023:    Patient presents today for follow-up for issues related to recent surgery.  She states she is having a lot of nasal

## (undated) DEVICE — TOWEL,OR,DSP,ST,BLUE,STD,4/PK,20PK/CS: Brand: MEDLINE

## (undated) DEVICE — ENT: Brand: MEDLINE INDUSTRIES, INC.

## (undated) DEVICE — INSTRUMENT TRACKER 9733533XOM ENT 1PK

## (undated) DEVICE — STAPLER SKIN H3.9MM WIRE DIA0.58MM CRWN 6.9MM 35 STPL ROT

## (undated) DEVICE — ELECTRODE ES L6.5IN DIA2.4MM TIP L0.2IN S STL EXT INSUL NDL

## (undated) DEVICE — SUTURE ABSORBABLE MONOFILAMENT 4-0 SC-1 18 IN PLN GUT 1824H

## (undated) DEVICE — KNIFE OPHTH 25MM 55DEG BVL UP ANG SFTY CATRCT XSTAR

## (undated) DEVICE — MERCY HEALTH WEST TURNOVER: Brand: MEDLINE INDUSTRIES, INC.

## (undated) DEVICE — CODMAN® SURGICAL PATTIES 1/2" X 3" (1.27CM X 7.62CM): Brand: CODMAN®

## (undated) DEVICE — SUTURE 4-0 L18IN ABSRB BRN PS-4 L16MM 1/2 CIR PRIM REV CUT 1643G

## (undated) DEVICE — APPLICATOR,COTTON-TIP,WOOD,3,STRL: Brand: MEDLINE

## (undated) DEVICE — TUBING, SUCTION, 1/4" X 12', STRAIGHT: Brand: MEDLINE

## (undated) DEVICE — GOWN SIRUS NONREIN XL W/TWL: Brand: MEDLINE INDUSTRIES, INC.

## (undated) DEVICE — KIT,ANTI FOG,W/SPONGE & FLUID,SOFT PACK: Brand: MEDLINE

## (undated) DEVICE — AGENT HEMSTAT W2XL3IN OXIDIZED REGENERATED CELOS ABSRB

## (undated) DEVICE — SYRINGE IRRIG 60ML SFT PLIABLE BLB EZ TO GRP 1 HND USE W/

## (undated) DEVICE — GLOVE SURG SZ 9 L12IN FNGR THK126MIL CRM LTX FREE

## (undated) DEVICE — SURGICAL SUCTION CONNECTING TUBE WITH MALE CONNECTOR AND SUCTION CLAMP, 2 FT. LONG (.6 M), 5 MM I.D.: Brand: CONMED

## (undated) DEVICE — Device: Brand: HEMOPORE

## (undated) DEVICE — PENCIL ES L3M BTTN SWCH S STL HEX LOK BLDE ELECTRD HOLSTER

## (undated) DEVICE — PATIENT TRACKER 9734887XOM NON-INVASIVE

## (undated) DEVICE — GLOVE ORTHO 7 1/2   MSG9475

## (undated) DEVICE — SYRINGE MED 10ML LUERLOCK TIP W/O SFTY DISP

## (undated) DEVICE — SPLINT 1524075 DOYLE BIVALVE SET C-FLEX: Brand: C-FLEX®